# Patient Record
Sex: MALE | Race: WHITE
[De-identification: names, ages, dates, MRNs, and addresses within clinical notes are randomized per-mention and may not be internally consistent; named-entity substitution may affect disease eponyms.]

---

## 2021-07-10 ENCOUNTER — HOSPITAL ENCOUNTER (EMERGENCY)
Dept: HOSPITAL 54 - ER | Age: 41
Discharge: HOME | End: 2021-07-10
Payer: COMMERCIAL

## 2021-07-10 VITALS — HEIGHT: 72 IN | WEIGHT: 170 LBS | BODY MASS INDEX: 23.03 KG/M2

## 2021-07-10 VITALS — SYSTOLIC BLOOD PRESSURE: 129 MMHG | DIASTOLIC BLOOD PRESSURE: 62 MMHG
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
## 2021-07-10 DIAGNOSIS — S62.314A: ICD-10-CM

## 2021-07-10 DIAGNOSIS — S62.336A: Primary | ICD-10-CM
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
## 2021-07-10 PROCEDURE — 99284 EMERGENCY DEPT VISIT MOD MDM: CPT

## 2021-07-10 PROCEDURE — 26742 TREAT FINGER FRACTURE EACH: CPT

## 2021-07-10 PROCEDURE — 73130 X-RAY EXAM OF HAND: CPT

## 2021-07-10 NOTE — NUR
PATINET BIBS C/O RIGHT HAND PAIN S/P PUNCHING WALL. PATINET IS A/O X 4, RR EVEN 
AND UNLABORED, NO SIGNS OF SOB NOTED. PATIENT CONNECTED TO [facility].